# Patient Record
Sex: MALE | Race: WHITE | Employment: UNEMPLOYED | URBAN - NONMETROPOLITAN AREA
[De-identification: names, ages, dates, MRNs, and addresses within clinical notes are randomized per-mention and may not be internally consistent; named-entity substitution may affect disease eponyms.]

---

## 2022-09-22 ENCOUNTER — HOSPITAL ENCOUNTER (EMERGENCY)
Age: 57
Discharge: HOME | End: 2022-09-22

## 2022-09-22 ENCOUNTER — APPOINTMENT (OUTPATIENT)
Dept: GENERAL RADIOLOGY | Age: 57
End: 2022-09-22

## 2022-09-22 VITALS
HEART RATE: 93 BPM | RESPIRATION RATE: 18 BRPM | WEIGHT: 245 LBS | BODY MASS INDEX: 38.45 KG/M2 | HEIGHT: 67 IN | DIASTOLIC BLOOD PRESSURE: 88 MMHG | SYSTOLIC BLOOD PRESSURE: 138 MMHG | TEMPERATURE: 97.7 F | OXYGEN SATURATION: 96 %

## 2022-09-22 DIAGNOSIS — J40 BRONCHITIS: ICD-10-CM

## 2022-09-22 DIAGNOSIS — J01.90 ACUTE SINUSITIS, RECURRENCE NOT SPECIFIED, UNSPECIFIED LOCATION: Primary | ICD-10-CM

## 2022-09-22 DIAGNOSIS — R03.0 ELEVATED BLOOD PRESSURE READING: ICD-10-CM

## 2022-09-22 LAB
INFLUENZA A: NOT DETECTED
INFLUENZA B: NOT DETECTED
SARS-COV-2 RNA, RT PCR: NOT DETECTED

## 2022-09-22 PROCEDURE — 94640 AIRWAY INHALATION TREATMENT: CPT

## 2022-09-22 PROCEDURE — 87636 SARSCOV2 & INF A&B AMP PRB: CPT

## 2022-09-22 PROCEDURE — 99202 OFFICE O/P NEW SF 15 MIN: CPT | Performed by: NURSE PRACTITIONER

## 2022-09-22 PROCEDURE — 6360000002 HC RX W HCPCS: Performed by: NURSE PRACTITIONER

## 2022-09-22 PROCEDURE — 96372 THER/PROPH/DIAG INJ SC/IM: CPT

## 2022-09-22 PROCEDURE — 99213 OFFICE O/P EST LOW 20 MIN: CPT

## 2022-09-22 PROCEDURE — 71046 X-RAY EXAM CHEST 2 VIEWS: CPT

## 2022-09-22 RX ORDER — ALBUTEROL SULFATE 90 UG/1
2 AEROSOL, METERED RESPIRATORY (INHALATION) EVERY 4 HOURS PRN
Qty: 18 G | Refills: 0 | Status: SHIPPED | OUTPATIENT
Start: 2022-09-22

## 2022-09-22 RX ORDER — PREDNISONE 20 MG/1
20 TABLET ORAL 2 TIMES DAILY
Qty: 10 TABLET | Refills: 0 | Status: SHIPPED | OUTPATIENT
Start: 2022-09-23 | End: 2022-09-28

## 2022-09-22 RX ORDER — ALBUTEROL SULFATE 2.5 MG/3ML
2.5 SOLUTION RESPIRATORY (INHALATION) ONCE
Status: COMPLETED | OUTPATIENT
Start: 2022-09-22 | End: 2022-09-22

## 2022-09-22 RX ORDER — AMLODIPINE AND VALSARTAN 10; 320 MG/1; MG/1
1 TABLET ORAL DAILY
COMMUNITY

## 2022-09-22 RX ORDER — METHYLPREDNISOLONE SODIUM SUCCINATE 125 MG/2ML
125 INJECTION, POWDER, LYOPHILIZED, FOR SOLUTION INTRAMUSCULAR; INTRAVENOUS ONCE
Status: COMPLETED | OUTPATIENT
Start: 2022-09-22 | End: 2022-09-22

## 2022-09-22 RX ORDER — DOXYCYCLINE HYCLATE 100 MG
100 TABLET ORAL 2 TIMES DAILY
Qty: 14 TABLET | Refills: 0 | Status: SHIPPED | OUTPATIENT
Start: 2022-09-22 | End: 2022-09-29

## 2022-09-22 RX ADMIN — METHYLPREDNISOLONE SODIUM SUCCINATE 125 MG: 125 INJECTION, POWDER, FOR SOLUTION INTRAMUSCULAR; INTRAVENOUS at 09:26

## 2022-09-22 RX ADMIN — ALBUTEROL SULFATE 2.5 MG: 2.5 SOLUTION RESPIRATORY (INHALATION) at 09:27

## 2022-09-22 ASSESSMENT — ENCOUNTER SYMPTOMS
NAUSEA: 0
WHEEZING: 1
SINUS PAIN: 1
COUGH: 1
DIARRHEA: 0
SHORTNESS OF BREATH: 1
VOMITING: 0
RHINORRHEA: 1
SORE THROAT: 0
SINUS PRESSURE: 1
ABDOMINAL PAIN: 0

## 2022-09-22 ASSESSMENT — PAIN - FUNCTIONAL ASSESSMENT
PAIN_FUNCTIONAL_ASSESSMENT: NONE - DENIES PAIN
PAIN_FUNCTIONAL_ASSESSMENT: NONE - DENIES PAIN

## 2022-09-22 NOTE — ED PROVIDER NOTES
40 [address]z       Chief Complaint   Patient presents with    Cough     CONGESTION    Ear Fullness     BILATERAL       Nurses Notes reviewed and I agree except as noted in the HPI. HISTORY OF PRESENT ILLNESS   Little Garner is a 62 y.o. male who presents for evaluation. The history is provided by the patient. Illness   Episode onset: 9/10/22. The problem has been gradually worsening. Nothing (Has taken over the counter cold and cough medications without improvement.) relieves the symptoms. Associated symptoms include a fever (last night felt warm), congestion, ear pain, rhinorrhea, cough and wheezing. Pertinent negatives include no abdominal pain, no diarrhea, no nausea, no vomiting, no headaches, no sore throat and no rash. He has received no recent medical care. Hx of CHF and hypertension. Follow with a PCP. The patient/patient representative has no other acute complaints at this time. REVIEW OF SYSTEMS     Review of Systems   Constitutional:  Positive for fever (last night felt warm). Negative for chills and fatigue. HENT:  Positive for congestion, ear pain, postnasal drip, rhinorrhea, sinus pressure and sinus pain. Negative for sore throat. Respiratory:  Positive for cough, shortness of breath and wheezing. Sometimes has to sleep in chair due to symptoms. Cardiovascular:  Negative for chest pain, palpitations and leg swelling. Gastrointestinal:  Negative for abdominal pain, diarrhea, nausea and vomiting. Skin:  Negative for rash. Allergic/Immunologic: Negative for environmental allergies and food allergies. Neurological:  Negative for headaches. PAST MEDICAL HISTORY         Diagnosis Date    CHF (congestive heart failure) ([facility] [address]     Diabetes mellitus ([facility] [address]     Hypertension        SURGICAL HISTORY     Patient  has no past surgical history on file.     CURRENT MEDICATIONS       Discharge Medication List as of 9/22/2022 10:11 AM        CONTINUE these medications which have NOT CHANGED    Details   amLODIPine-valsartan (EXFORGE)  MG per tablet Take 1 tablet by mouth dailyHistorical Med      Glimepiride (AMARYL PO) Take by mouthHistorical Med      Rosuvastatin Calcium (CRESTOR PO) Take by mouthHistorical Med      Furosemide (LASIX PO) Take by mouthHistorical Med             ALLERGIES     Patient is has No Known Allergies. FAMILY HISTORY     Patient'sfamily history includes Cancer in his father. SOCIAL HISTORY     Patient  reports that he has never smoked. His smokeless tobacco use includes chew. He reports that he does not currently use alcohol. He reports that he does not use drugs. PHYSICAL EXAM     ED TRIAGE VITALS  BP: 138/88, Temp: 97.7 °F (36.5 °C), Heart Rate: 93, Resp: 18, SpO2: 96 %  Physical Exam  Vitals and nursing note reviewed. Constitutional:       General: He is not in acute distress. Appearance: Normal appearance. He is well-developed and overweight. HENT:      Head: Normocephalic and atraumatic. Right Ear: Tympanic membrane, ear canal and external ear normal.      Left Ear: Tympanic membrane, ear canal and external ear normal.      Nose: Mucosal edema and congestion present. Mouth/Throat:      Lips: Pink. Mouth: Mucous membranes are moist.      Pharynx: Oropharynx is clear. Eyes:      Conjunctiva/sclera:      Right eye: Right conjunctiva is not injected. Left eye: Left conjunctiva is not injected. Pupils: Pupils are equal.   Cardiovascular:      Rate and Rhythm: Normal rate. Heart sounds: Normal heart sounds. Pulmonary:      Effort: Pulmonary effort is normal. No respiratory distress. Breath sounds: Normal air entry. Decreased breath sounds (throughout) present. Musculoskeletal:      Cervical back: Normal range of motion. Lymphadenopathy:      Cervical: No cervical adenopathy. Skin:     General: Skin is warm and dry.       Findings: No rash (on exposed surfaces). Neurological:      Mental Status: He is alert and oriented to person, place, and time. Gait: Gait is intact. Psychiatric:         Mood and Affect: Mood normal.         Speech: Speech normal.         Behavior: Behavior is cooperative. DIAGNOSTIC RESULTS   Labs:  Abnormal Labs Reviewed - No abnormal labs to display     IMAGING:  XR CHEST (2 VW)   Final Result   Stable radiographic appearance of the chest. No evidence of an acute process. **This report has been created using voice recognition software. It may contain minor errors which are inherent in voice recognition technology. **      Final report electronically signed by Dr. Zack Newell on 9/22/2022 10:00 AM        URGENT CARE COURSE:     Vitals:    09/22/22 0907 09/22/22 1016   BP: (!) 154/100 138/88   Pulse: 93 93   Resp: 22 18   Temp: 97.4 °F (36.3 °C) 97.7 °F (36.5 °C)   SpO2: 96% 96%   Weight: 220 lb (99.8 kg)    Height: 5' 7\" (1.702 m)        Medications   albuterol (PROVENTIL) nebulizer solution 2.5 mg (2.5 mg Nebulization Given 9/22/22 0927)   methylPREDNISolone sodium (SOLU-MEDROL) injection 125 mg (125 mg IntraMUSCular Given 9/22/22 0926)     PROCEDURES:  FINALIMPRESSION      1. Acute sinusitis, recurrence not specified, unspecified location    2. Elevated blood pressure reading    3. Bronchitis        DISPOSITION/PLAN   DISPOSITION Decision To Discharge 09/22/2022 10:07:53 AM      Physical assessment findings, diagnostic testing(s) if applicable, and vital signs reviewed with patient/patient representative. Differential diagnosis(s) discussed with patient/patient representative. Prescription medications and/or over-the-counter medications for symptom management discussed. Patient is to follow-up with family care provider in 2-3 days if no improvement. If symptoms should worsen or change, go to the ED.  Patient/patient representative is aware of care plan, questions answered, verbalizes

## 2022-09-22 NOTE — DISCHARGE INSTRUCTIONS
Your blood pressure is elevated and will need to be monitored. Have this rechecked at your doctor's office or make an pina w/ the doctor you were referred to on your discharge instructions to have your blood pressure rechecked sometime within the next 7 days. Untreated elevated blood pressure puts you at risk for stroke, heart attack, kidney disease and other serious medical conditions      Suggestions for symptom management that are available over the counter. If you have questions regarding allergies or contraindications to use, please speak to the pharmacy staff or your family provider. For fevers or pain: acetaminophen (Tylenol), ibuprofen (Motrin)  For dry cough: medications containing dextromethorphan, such as Delsym, Robitussin DM or Mucinex DM and medicated throat lozenges  For congestion or sinus pressure: decongestant nasal sprays, such as Afrin (for up to 3 days), medications containing guaifenesin to help break up mucus, such as Mucinex or Robitussin, nasal steroid sprays, such as Flonase, Sensimist, Rhinocort or Nasonex and saline nasal sprays, neti pot or sinus rinse bottle  For runny nose, sneezing or watery/itchy eyes: less sedating antihistamines, such as loratidine (Claritin), fexofenadine (Allegra) or Cetirizine (Zyrtec) and antihistamine eye drops, such as ketotifen (Zaditor, Alaway) or olopatadine (Pataday)  For sore throat:  Chloraseptic throat spray or sore throat lozenges   If you have high blood pressure, a brand like Coricidin HBP may be an option. you should avoid medications containing pseudoephedrine or phenylephrine, such as Sudafed,  running a humidifier in your bedroom may be helpful for many of your symptoms. If your cough is keeping you awake at night, you can try raising your head with an extra pillow. If the skin around your nose and lips becomes sore, you can put some petroleum jelly on the area.       Suggestions for over-the-counter supplements to help your immune system,

## 2022-09-22 NOTE — ED TRIAGE NOTES
To room 2 slight MURRIETA noted.   C/o cough congestion diarrhea x 1 today fatigue headache  ear fullness